# Patient Record
Sex: FEMALE | Race: ASIAN | NOT HISPANIC OR LATINO
[De-identification: names, ages, dates, MRNs, and addresses within clinical notes are randomized per-mention and may not be internally consistent; named-entity substitution may affect disease eponyms.]

---

## 2017-01-13 ENCOUNTER — APPOINTMENT (OUTPATIENT)
Dept: INTERNAL MEDICINE | Facility: CLINIC | Age: 47
End: 2017-01-13

## 2017-01-13 VITALS
SYSTOLIC BLOOD PRESSURE: 107 MMHG | HEART RATE: 79 BPM | DIASTOLIC BLOOD PRESSURE: 67 MMHG | RESPIRATION RATE: 17 BRPM | HEIGHT: 65.75 IN | WEIGHT: 126 LBS | TEMPERATURE: 97.9 F | BODY MASS INDEX: 20.49 KG/M2 | OXYGEN SATURATION: 100 %

## 2017-01-13 DIAGNOSIS — M54.5 LOW BACK PAIN: ICD-10-CM

## 2017-11-30 PROBLEM — Z23 NEED FOR INFLUENZA VACCINATION: Status: ACTIVE | Noted: 2017-11-30

## 2017-11-30 PROBLEM — J06.9 ACUTE UPPER RESPIRATORY INFECTION: Status: RESOLVED | Noted: 2017-01-13 | Resolved: 2017-11-30

## 2017-12-01 ENCOUNTER — APPOINTMENT (OUTPATIENT)
Dept: INTERNAL MEDICINE | Facility: CLINIC | Age: 47
End: 2017-12-01
Payer: OTHER GOVERNMENT

## 2017-12-01 VITALS
SYSTOLIC BLOOD PRESSURE: 86 MMHG | OXYGEN SATURATION: 100 % | TEMPERATURE: 97.7 F | WEIGHT: 128 LBS | BODY MASS INDEX: 20.82 KG/M2 | HEART RATE: 77 BPM | RESPIRATION RATE: 17 BRPM | DIASTOLIC BLOOD PRESSURE: 54 MMHG

## 2017-12-01 DIAGNOSIS — M79.644 PAIN IN RIGHT FINGER(S): ICD-10-CM

## 2017-12-01 DIAGNOSIS — Z87.440 PERSONAL HISTORY OF URINARY (TRACT) INFECTIONS: ICD-10-CM

## 2017-12-01 DIAGNOSIS — J06.9 ACUTE UPPER RESPIRATORY INFECTION, UNSPECIFIED: ICD-10-CM

## 2017-12-01 DIAGNOSIS — M77.12 LATERAL EPICONDYLITIS, LEFT ELBOW: ICD-10-CM

## 2017-12-01 DIAGNOSIS — Z23 ENCOUNTER FOR IMMUNIZATION: ICD-10-CM

## 2017-12-01 DIAGNOSIS — Z87.09 PERSONAL HISTORY OF OTHER DISEASES OF THE RESPIRATORY SYSTEM: ICD-10-CM

## 2017-12-01 LAB
BILIRUB UR QL STRIP: NEGATIVE
CLARITY UR: CLEAR
COLLECTION METHOD: NORMAL
GLUCOSE UR-MCNC: NEGATIVE
HCG UR QL: 0.2 EU/DL
HGB UR QL STRIP.AUTO: ABNORMAL
KETONES UR-MCNC: NEGATIVE
LEUKOCYTE ESTERASE UR QL STRIP: ABNORMAL
NITRITE UR QL STRIP: NEGATIVE
PH UR STRIP: 7
PROT UR STRIP-MCNC: NEGATIVE
SP GR UR STRIP: 1.01

## 2017-12-01 PROCEDURE — 99214 OFFICE O/P EST MOD 30 MIN: CPT | Mod: 25

## 2017-12-01 PROCEDURE — 81003 URINALYSIS AUTO W/O SCOPE: CPT | Mod: QW

## 2017-12-01 RX ORDER — AZITHROMYCIN 250 MG/1
250 TABLET, FILM COATED ORAL
Qty: 1 | Refills: 0 | Status: COMPLETED | COMMUNITY
Start: 2017-01-13 | End: 2017-12-01

## 2017-12-01 RX ORDER — NAPROXEN 500 MG/1
500 TABLET ORAL
Qty: 30 | Refills: 0 | Status: ACTIVE | COMMUNITY
Start: 2017-12-01 | End: 1900-01-01

## 2017-12-01 RX ORDER — NITROFURANTOIN (MONOHYDRATE/MACROCRYSTALS) 25; 75 MG/1; MG/1
100 CAPSULE ORAL TWICE DAILY
Qty: 14 | Refills: 0 | Status: ACTIVE | COMMUNITY
Start: 2016-09-06 | End: 1900-01-01

## 2017-12-01 RX ORDER — BENZONATATE 100 MG/1
100 CAPSULE ORAL
Qty: 30 | Refills: 5 | Status: COMPLETED | COMMUNITY
Start: 2017-01-13 | End: 2017-12-01

## 2017-12-02 PROBLEM — M79.644 THUMB PAIN, RIGHT: Status: ACTIVE | Noted: 2017-12-01

## 2017-12-05 LAB — BACTERIA UR CULT: NORMAL

## 2019-03-18 ENCOUNTER — RX RENEWAL (OUTPATIENT)
Age: 49
End: 2019-03-18

## 2019-03-18 RX ORDER — ERGOCALCIFEROL 1.25 MG/1
1.25 MG CAPSULE, LIQUID FILLED ORAL
Qty: 12 | Refills: 3 | Status: ACTIVE | COMMUNITY
Start: 2019-03-18 | End: 1900-01-01

## 2019-08-29 ENCOUNTER — APPOINTMENT (OUTPATIENT)
Dept: PULMONOLOGY | Facility: CLINIC | Age: 49
End: 2019-08-29

## 2019-09-19 ENCOUNTER — APPOINTMENT (OUTPATIENT)
Dept: PULMONOLOGY | Facility: CLINIC | Age: 49
End: 2019-09-19

## 2021-08-30 ENCOUNTER — APPOINTMENT (OUTPATIENT)
Dept: PULMONOLOGY | Facility: CLINIC | Age: 51
End: 2021-08-30
Payer: COMMERCIAL

## 2021-08-30 VITALS
HEART RATE: 75 BPM | DIASTOLIC BLOOD PRESSURE: 63 MMHG | SYSTOLIC BLOOD PRESSURE: 98 MMHG | TEMPERATURE: 98.7 F | OXYGEN SATURATION: 98 %

## 2021-08-30 DIAGNOSIS — Z86.11 PERSONAL HISTORY OF TUBERCULOSIS: ICD-10-CM

## 2021-08-30 PROCEDURE — 99214 OFFICE O/P EST MOD 30 MIN: CPT | Mod: 25

## 2021-08-30 PROCEDURE — 71046 X-RAY EXAM CHEST 2 VIEWS: CPT

## 2021-08-30 PROCEDURE — 36415 COLL VENOUS BLD VENIPUNCTURE: CPT

## 2021-08-31 NOTE — PROCEDURE
[FreeTextEntry1] : \par  Xray Chest 2 Views PA/Lat             Final\par \par No Documents Attached\par \par \par \par \par   \par Chest x-ray PA and lateral views performed in my office today showed multiple calcified lung nodules on bilateral upper lobes, no evidence of infiltrates or pleural effusions. \par \par \par  Ordered by: SIA PAT       Collected/Examined: 30Aug2021 06:51PM       \par Verified by: SIA PAT 30Aug2021 06:54PM       \par  Result Communication: No patient communication needed at this time;\par Stage: Final       \par  Performed at: In Office       Performed by: SIA PAT       Resulted: 30Aug2021 06:51PM       Last Updated: 30Aug2021 06:54PM       Accession: [id number]

## 2021-08-31 NOTE — ASSESSMENT
[FreeTextEntry1] : Check sputum for culture and AFB.\par Venipuncture with labs drawn in office\par Get chest CT scan for further evaluation.

## 2021-09-06 LAB
ALBUMIN SERPL ELPH-MCNC: 4.8 G/DL
ALP BLD-CCNC: 59 U/L
ALT SERPL-CCNC: 13 U/L
ANION GAP SERPL CALC-SCNC: 10 MMOL/L
AST SERPL-CCNC: 19 U/L
BACTERIA SPT CULT: ABNORMAL
BASOPHILS # BLD AUTO: 0.02 K/UL
BASOPHILS NFR BLD AUTO: 0.5 %
BILIRUB SERPL-MCNC: 0.3 MG/DL
BUN SERPL-MCNC: 14 MG/DL
CALCIUM SERPL-MCNC: 9.6 MG/DL
CHLORIDE SERPL-SCNC: 103 MMOL/L
CO2 SERPL-SCNC: 27 MMOL/L
CREAT SERPL-MCNC: 0.65 MG/DL
EOSINOPHIL # BLD AUTO: 0.04 K/UL
EOSINOPHIL NFR BLD AUTO: 1.1 %
ERYTHROCYTE [SEDIMENTATION RATE] IN BLOOD BY WESTERGREN METHOD: 14 MM/HR
GLUCOSE SERPL-MCNC: 94 MG/DL
HCT VFR BLD CALC: 38.9 %
HGB BLD-MCNC: 12.1 G/DL
IMM GRANULOCYTES NFR BLD AUTO: 0.3 %
LYMPHOCYTES # BLD AUTO: 1.6 K/UL
LYMPHOCYTES NFR BLD AUTO: 42.1 %
M TB IFN-G BLD-IMP: NEGATIVE
MAN DIFF?: NORMAL
MCHC RBC-ENTMCNC: 28.7 PG
MCHC RBC-ENTMCNC: 31.1 GM/DL
MCV RBC AUTO: 92.2 FL
MONOCYTES # BLD AUTO: 0.19 K/UL
MONOCYTES NFR BLD AUTO: 5 %
NEUTROPHILS # BLD AUTO: 1.94 K/UL
NEUTROPHILS NFR BLD AUTO: 51 %
PLATELET # BLD AUTO: 193 K/UL
POTASSIUM SERPL-SCNC: 4.6 MMOL/L
PROT SERPL-MCNC: 7.7 G/DL
QUANTIFERON TB PLUS MITOGEN MINUS NIL: 1.26 IU/ML
QUANTIFERON TB PLUS NIL: 0.04 IU/ML
QUANTIFERON TB PLUS TB1 MINUS NIL: 0.02 IU/ML
QUANTIFERON TB PLUS TB2 MINUS NIL: 0.01 IU/ML
RBC # BLD: 4.22 M/UL
RBC # FLD: 12.1 %
SODIUM SERPL-SCNC: 140 MMOL/L
WBC # FLD AUTO: 3.8 K/UL

## 2021-09-06 RX ORDER — AMOXICILLIN AND CLAVULANATE POTASSIUM 875; 125 MG/1; MG/1
875-125 TABLET, COATED ORAL
Qty: 14 | Refills: 0 | Status: COMPLETED | COMMUNITY
Start: 2021-09-06 | End: 2021-09-13

## 2021-09-17 ENCOUNTER — APPOINTMENT (OUTPATIENT)
Dept: PULMONOLOGY | Facility: CLINIC | Age: 51
End: 2021-09-17
Payer: COMMERCIAL

## 2021-09-17 VITALS
TEMPERATURE: 97.6 F | HEART RATE: 83 BPM | SYSTOLIC BLOOD PRESSURE: 97 MMHG | OXYGEN SATURATION: 99 % | DIASTOLIC BLOOD PRESSURE: 61 MMHG

## 2021-09-17 PROCEDURE — 99214 OFFICE O/P EST MOD 30 MIN: CPT

## 2021-09-19 NOTE — DISCUSSION/SUMMARY
[FreeTextEntry1] : Cough positive secondary to bronchiectasis\par Check pulmonary function test for follow-up

## 2021-09-19 NOTE — PROCEDURE
[FreeTextEntry1] : Chest CT scan performed on September 3, 2021 showed stable scarring at both lung apices left greater than right.  Stable bilateral calcified granulomas.  Bronchiectasis with foci of mucous plugging again noted in the right middle lobe.

## 2021-10-15 DIAGNOSIS — Z01.812 ENCOUNTER FOR PREPROCEDURAL LABORATORY EXAMINATION: ICD-10-CM

## 2021-10-21 ENCOUNTER — APPOINTMENT (OUTPATIENT)
Dept: PULMONOLOGY | Facility: CLINIC | Age: 51
End: 2021-10-21
Payer: COMMERCIAL

## 2021-10-21 VITALS
DIASTOLIC BLOOD PRESSURE: 62 MMHG | OXYGEN SATURATION: 99 % | HEART RATE: 88 BPM | TEMPERATURE: 98 F | SYSTOLIC BLOOD PRESSURE: 96 MMHG

## 2021-10-21 PROCEDURE — 88738 HGB QUANT TRANSCUTANEOUS: CPT

## 2021-10-21 PROCEDURE — 99214 OFFICE O/P EST MOD 30 MIN: CPT | Mod: 25

## 2021-10-21 PROCEDURE — 94727 GAS DIL/WSHOT DETER LNG VOL: CPT

## 2021-10-21 PROCEDURE — 94010 BREATHING CAPACITY TEST: CPT

## 2021-10-21 PROCEDURE — 94729 DIFFUSING CAPACITY: CPT

## 2021-10-21 PROCEDURE — ZZZZZ: CPT

## 2021-10-22 NOTE — PROCEDURE
[FreeTextEntry1] : Pulmonary function test performed in my office today: Spirometry is within normal limits; lung volume is within normal limits; diffusion is within normal limits.

## 2021-10-22 NOTE — DISCUSSION/SUMMARY
[FreeTextEntry1] : Cough possibly secondary to bronchiectasis\par Check pulmonary function test for follow-up

## 2021-10-25 RX ORDER — BUDESONIDE AND FORMOTEROL FUMARATE DIHYDRATE 160; 4.5 UG/1; UG/1
160-4.5 AEROSOL RESPIRATORY (INHALATION) TWICE DAILY
Qty: 1 | Refills: 1 | Status: ACTIVE | COMMUNITY
Start: 2021-10-25 | End: 1900-01-01

## 2021-11-01 RX ORDER — FLUTICASONE FUROATE, UMECLIDINIUM BROMIDE AND VILANTEROL TRIFENATATE 100; 62.5; 25 UG/1; UG/1; UG/1
100-62.5-25 POWDER RESPIRATORY (INHALATION) DAILY
Qty: 3 | Refills: 3 | Status: ACTIVE | COMMUNITY
Start: 2021-10-21 | End: 1900-01-01

## 2021-11-03 RX ORDER — FLUTICASONE FUROATE AND VILANTEROL TRIFENATATE 100; 25 UG/1; UG/1
100-25 POWDER RESPIRATORY (INHALATION) DAILY
Qty: 3 | Refills: 3 | Status: ACTIVE | COMMUNITY
Start: 2021-11-03 | End: 1900-01-01

## 2021-12-09 ENCOUNTER — APPOINTMENT (OUTPATIENT)
Dept: PULMONOLOGY | Facility: CLINIC | Age: 51
End: 2021-12-09
Payer: COMMERCIAL

## 2021-12-09 VITALS
BODY MASS INDEX: 20.4 KG/M2 | WEIGHT: 125.44 LBS | TEMPERATURE: 97.3 F | DIASTOLIC BLOOD PRESSURE: 64 MMHG | SYSTOLIC BLOOD PRESSURE: 100 MMHG | OXYGEN SATURATION: 100 % | HEIGHT: 65.75 IN | HEART RATE: 82 BPM

## 2021-12-09 PROCEDURE — 99214 OFFICE O/P EST MOD 30 MIN: CPT

## 2022-02-24 ENCOUNTER — APPOINTMENT (OUTPATIENT)
Dept: PULMONOLOGY | Facility: CLINIC | Age: 52
End: 2022-02-24

## 2022-03-10 ENCOUNTER — APPOINTMENT (OUTPATIENT)
Dept: PULMONOLOGY | Facility: CLINIC | Age: 52
End: 2022-03-10
Payer: COMMERCIAL

## 2022-03-10 VITALS
DIASTOLIC BLOOD PRESSURE: 65 MMHG | HEART RATE: 66 BPM | SYSTOLIC BLOOD PRESSURE: 104 MMHG | OXYGEN SATURATION: 100 % | TEMPERATURE: 97.4 F

## 2022-03-10 PROCEDURE — 99214 OFFICE O/P EST MOD 30 MIN: CPT

## 2022-03-10 NOTE — REASON FOR VISIT
[Follow-Up] : a follow-up visit [Abnormal CXR/ Chest CT] : an abnormal CXR/ chest CT [Cough] : cough [TextBox_44] : Complains of dry cough and throat itch at times

## 2022-04-14 ENCOUNTER — APPOINTMENT (OUTPATIENT)
Dept: PULMONOLOGY | Facility: CLINIC | Age: 52
End: 2022-04-14

## 2022-05-19 ENCOUNTER — APPOINTMENT (OUTPATIENT)
Dept: PULMONOLOGY | Facility: CLINIC | Age: 52
End: 2022-05-19
Payer: COMMERCIAL

## 2022-05-19 VITALS
RESPIRATION RATE: 16 BRPM | WEIGHT: 125 LBS | DIASTOLIC BLOOD PRESSURE: 61 MMHG | SYSTOLIC BLOOD PRESSURE: 96 MMHG | OXYGEN SATURATION: 100 % | HEIGHT: 65 IN | BODY MASS INDEX: 20.83 KG/M2 | TEMPERATURE: 97.8 F | HEART RATE: 73 BPM

## 2022-05-19 PROCEDURE — 88738 HGB QUANT TRANSCUTANEOUS: CPT

## 2022-05-19 PROCEDURE — ZZZZZ: CPT

## 2022-05-19 PROCEDURE — 94727 GAS DIL/WSHOT DETER LNG VOL: CPT

## 2022-05-19 PROCEDURE — 99214 OFFICE O/P EST MOD 30 MIN: CPT | Mod: 25

## 2022-05-19 PROCEDURE — 94729 DIFFUSING CAPACITY: CPT

## 2022-05-19 PROCEDURE — 94010 BREATHING CAPACITY TEST: CPT

## 2022-05-19 NOTE — ASSESSMENT
[FreeTextEntry1] : Continue Breo Ellipta\par Continue omeprazole for GERD\par Return for pulmonary follow-up and PFT in 3 months.

## 2022-05-19 NOTE — PROCEDURE
[FreeTextEntry1] : Pulmonary Function Test performed in my office today: Spirometry: Within normal limits; Lung Volume: Within normal limits; Diffusion: Within normal limits.\par

## 2022-05-23 LAB — POCT - HEMOGLOBIN (HGB), QUANTITATIVE, TRANSCUTANEOUS: 15.7

## 2022-08-11 ENCOUNTER — APPOINTMENT (OUTPATIENT)
Dept: PULMONOLOGY | Facility: CLINIC | Age: 52
End: 2022-08-11

## 2022-08-11 VITALS
SYSTOLIC BLOOD PRESSURE: 97 MMHG | BODY MASS INDEX: 20.83 KG/M2 | OXYGEN SATURATION: 98 % | HEART RATE: 92 BPM | TEMPERATURE: 97.8 F | RESPIRATION RATE: 16 BRPM | WEIGHT: 125 LBS | DIASTOLIC BLOOD PRESSURE: 62 MMHG | HEIGHT: 65 IN

## 2022-08-11 DIAGNOSIS — U07.1 COVID-19: ICD-10-CM

## 2022-08-11 PROCEDURE — 88738 HGB QUANT TRANSCUTANEOUS: CPT

## 2022-08-11 PROCEDURE — 94727 GAS DIL/WSHOT DETER LNG VOL: CPT

## 2022-08-11 PROCEDURE — ZZZZZ: CPT

## 2022-08-11 PROCEDURE — 99214 OFFICE O/P EST MOD 30 MIN: CPT | Mod: 25

## 2022-08-11 PROCEDURE — 94010 BREATHING CAPACITY TEST: CPT

## 2022-08-11 PROCEDURE — 71046 X-RAY EXAM CHEST 2 VIEWS: CPT

## 2022-08-11 PROCEDURE — 36415 COLL VENOUS BLD VENIPUNCTURE: CPT

## 2022-08-11 PROCEDURE — 94729 DIFFUSING CAPACITY: CPT

## 2022-08-11 RX ORDER — AZITHROMYCIN 250 MG/1
250 TABLET, FILM COATED ORAL
Qty: 1 | Refills: 0 | Status: ACTIVE | COMMUNITY
Start: 2022-08-11 | End: 1900-01-01

## 2022-08-11 RX ORDER — PREDNISONE 10 MG/1
10 TABLET ORAL
Qty: 12 | Refills: 0 | Status: ACTIVE | COMMUNITY
Start: 2022-08-11 | End: 1900-01-01

## 2022-08-12 NOTE — DISCUSSION/SUMMARY
[FreeTextEntry1] : Cough possibly secondary to bronchiectasis exacerbation and GERD.  Lung scarring secondary to history of tuberculosis infection\par

## 2022-08-12 NOTE — ASSESSMENT
[FreeTextEntry1] : Venipuncture with labs drawn in office\par Start Z-Charlie and prednisone taper\par Continue Breo Ellipta\par Continue omeprazole for GERD\par Return for pulmonary follow-up and PFT in 3 months.

## 2022-08-20 LAB
ALBUMIN SERPL ELPH-MCNC: 5 G/DL
ALP BLD-CCNC: 69 U/L
ALT SERPL-CCNC: 16 U/L
ANION GAP SERPL CALC-SCNC: 13 MMOL/L
AST SERPL-CCNC: 19 U/L
BASOPHILS # BLD AUTO: 0.03 K/UL
BASOPHILS NFR BLD AUTO: 0.6 %
BILIRUB SERPL-MCNC: 0.6 MG/DL
BUN SERPL-MCNC: 13 MG/DL
CALCIUM SERPL-MCNC: 10 MG/DL
CHLORIDE SERPL-SCNC: 103 MMOL/L
CO2 SERPL-SCNC: 25 MMOL/L
CREAT SERPL-MCNC: 0.68 MG/DL
CRP SERPL-MCNC: <3 MG/L
DEPRECATED D DIMER PPP IA-ACNC: <150 NG/ML DDU
EGFR: 104 ML/MIN/1.73M2
EOSINOPHIL # BLD AUTO: 0.05 K/UL
EOSINOPHIL NFR BLD AUTO: 1 %
FERRITIN SERPL-MCNC: 226 NG/ML
GLUCOSE SERPL-MCNC: 93 MG/DL
HCT VFR BLD CALC: 41.6 %
HGB BLD-MCNC: 13.4 G/DL
IMM GRANULOCYTES NFR BLD AUTO: 0.2 %
LYMPHOCYTES # BLD AUTO: 1.72 K/UL
LYMPHOCYTES NFR BLD AUTO: 35.2 %
MAN DIFF?: NORMAL
MCHC RBC-ENTMCNC: 29.3 PG
MCHC RBC-ENTMCNC: 32.2 GM/DL
MCV RBC AUTO: 91 FL
MONOCYTES # BLD AUTO: 0.31 K/UL
MONOCYTES NFR BLD AUTO: 6.3 %
NEUTROPHILS # BLD AUTO: 2.77 K/UL
NEUTROPHILS NFR BLD AUTO: 56.7 %
PLATELET # BLD AUTO: 210 K/UL
POCT - HEMOGLOBIN (HGB), QUANTITATIVE, TRANSCUTANEOUS: 12.7
POTASSIUM SERPL-SCNC: 4.6 MMOL/L
PROCALCITONIN SERPL-MCNC: <0.02 NG/ML
PROT SERPL-MCNC: 7.7 G/DL
RBC # BLD: 4.57 M/UL
RBC # FLD: 11.8 %
SODIUM SERPL-SCNC: 140 MMOL/L
WBC # FLD AUTO: 4.89 K/UL

## 2022-09-01 ENCOUNTER — APPOINTMENT (OUTPATIENT)
Dept: PULMONOLOGY | Facility: CLINIC | Age: 52
End: 2022-09-01

## 2023-01-13 ENCOUNTER — APPOINTMENT (OUTPATIENT)
Dept: PULMONOLOGY | Facility: CLINIC | Age: 53
End: 2023-01-13

## 2023-01-13 ENCOUNTER — APPOINTMENT (OUTPATIENT)
Dept: PULMONOLOGY | Facility: CLINIC | Age: 53
End: 2023-01-13
Payer: COMMERCIAL

## 2023-01-13 VITALS — SYSTOLIC BLOOD PRESSURE: 93 MMHG | OXYGEN SATURATION: 99 % | HEART RATE: 87 BPM | DIASTOLIC BLOOD PRESSURE: 64 MMHG

## 2023-01-13 PROCEDURE — 94729 DIFFUSING CAPACITY: CPT

## 2023-01-13 PROCEDURE — 99214 OFFICE O/P EST MOD 30 MIN: CPT | Mod: 25

## 2023-01-13 PROCEDURE — 94010 BREATHING CAPACITY TEST: CPT

## 2023-01-13 PROCEDURE — 94727 GAS DIL/WSHOT DETER LNG VOL: CPT

## 2023-01-13 PROCEDURE — 88738 HGB QUANT TRANSCUTANEOUS: CPT

## 2023-01-15 NOTE — HISTORY OF PRESENT ILLNESS
[TextBox_4] : PAULETTE OATES is a 52 year old female with history of Bronchiectasis, GERD, who presents to the office for follow up evaluation of a cough. Patient reports that her dry cough has significantly improved since last visit. She denies taking her Breo for her history of Bronchiectasis. She denies taking her Omeprazole for her history of GERD.\par \par \par

## 2023-01-15 NOTE — ASSESSMENT
[FreeTextEntry1] : Obtained and reviewed PFT results with patient today. \par Repeat chest CT scan to follow up on her history of Bronchiectasis, lung scarring, and hemoptysis.\par Discontinue Breo and omeprazole, unless symptoms flare up\par Return for pulmonary follow up 1 month after receiving chest CT scan

## 2023-01-15 NOTE — DISCUSSION/SUMMARY
[FreeTextEntry1] : Ms. PAULETTE OATES is an 52 year old female, history of Bronchiectasis, GERD with prior cough possibly secondary to bronchiectasis exacerbation and GERD that has significantly improved since last visit. Patient is asymptomatic and we will discontinue her current medication unless her symptoms flare up again.

## 2023-01-15 NOTE — ADDENDUM
[FreeTextEntry1] : I, Virgie Tranning, acted solely as a scribe for Dr. Zhanna Ramon D.O., on this date 01/13/2023. \par \par All medical record entries made by the Scribe were at my, Dr. Zhanna Ramon D.O., direction and personally dictated by me on 01/13/2023. I have reviewed the chart and agree that the record accurately reflects my personal performance of the history, physical exam, assessment and plan. I have also personally directed, reviewed, and agreed with the chart.

## 2023-02-17 ENCOUNTER — APPOINTMENT (OUTPATIENT)
Dept: PULMONOLOGY | Facility: CLINIC | Age: 53
End: 2023-02-17
Payer: COMMERCIAL

## 2023-02-17 VITALS — DIASTOLIC BLOOD PRESSURE: 65 MMHG | SYSTOLIC BLOOD PRESSURE: 95 MMHG | OXYGEN SATURATION: 100 % | HEART RATE: 91 BPM

## 2023-02-17 PROCEDURE — 99213 OFFICE O/P EST LOW 20 MIN: CPT

## 2023-02-17 NOTE — ADDENDUM
[FreeTextEntry1] : I, Virgie Tranning, acted solely as a scribe for Dr. Zhanna Ramon D.O., on this date 02/17/2023. \par \par All medical record entries made by the Scribe were at my, Dr. Zhanna Ramon D.O., direction and personally dictated by me on 02/17/2023. I have reviewed the chart and agree that the record accurately reflects my personal performance of the history, physical exam, assessment and plan. I have also personally directed, reviewed, and agreed with the chart.

## 2023-02-17 NOTE — REASON FOR VISIT
[Follow-Up] : a follow-up visit [Cough] : cough [Abnormal CXR/ Chest CT] : an abnormal CXR/ chest CT [Shortness of Breath] : shortness of breath

## 2023-02-17 NOTE — HISTORY OF PRESENT ILLNESS
[TextBox_4] : PAULETTE OATES is a 52 year old female, with history of Bronchiectasis, GERD, lung scarring, who presents to the office for follow up evaluation. Patient reports that she is feeling well overall with no respiratory complaints. She reports that her prior dyspnea has resolved and she only has an occasional cough.

## 2023-02-17 NOTE — PROCEDURE
[FreeTextEntry1] : Chest CT scan done on 02/10/2023 reviewed by Dr. Zhanna Ramon:\par \par Impression:\par \par Stable biapical scarring\par \par Stable bilateral calcified granulomas\par \par Stable bronchiectasis and mucous plugging in the right middle lobe.

## 2023-02-17 NOTE — DISCUSSION/SUMMARY
[FreeTextEntry1] : Ms. PAULETTE OATES is an 52 year old female, history of Bronchiectasis, GERD, lung scarring. Patient's prior cough and dyspnea has resolved. Secondly, patient's lung scarring and bronchiectasis are currently stable indicated on recent chest CT scan. Medication is unnecessary at this time.

## 2023-02-17 NOTE — ASSESSMENT
[FreeTextEntry1] : Reviewed chest CT scan done on 02/10/2023 with patient today. \par Return for pulmonary follow up in 3 months \par Repeat chest CT scan for follow-up in 1 year.

## 2023-06-30 ENCOUNTER — APPOINTMENT (OUTPATIENT)
Dept: PULMONOLOGY | Facility: CLINIC | Age: 53
End: 2023-06-30
Payer: COMMERCIAL

## 2023-06-30 VITALS
BODY MASS INDEX: 21.17 KG/M2 | RESPIRATION RATE: 14 BRPM | WEIGHT: 124 LBS | SYSTOLIC BLOOD PRESSURE: 102 MMHG | HEART RATE: 95 BPM | HEIGHT: 64 IN | OXYGEN SATURATION: 99 % | DIASTOLIC BLOOD PRESSURE: 65 MMHG

## 2023-06-30 DIAGNOSIS — R35.89 OTHER POLYURIA: ICD-10-CM

## 2023-06-30 DIAGNOSIS — R31.29 OTHER MICROSCOPIC HEMATURIA: ICD-10-CM

## 2023-06-30 PROCEDURE — 94727 GAS DIL/WSHOT DETER LNG VOL: CPT

## 2023-06-30 PROCEDURE — 94010 BREATHING CAPACITY TEST: CPT

## 2023-06-30 PROCEDURE — ZZZZZ: CPT

## 2023-06-30 PROCEDURE — 81003 URINALYSIS AUTO W/O SCOPE: CPT | Mod: QW

## 2023-06-30 PROCEDURE — 88738 HGB QUANT TRANSCUTANEOUS: CPT

## 2023-06-30 PROCEDURE — 82044 UR ALBUMIN SEMIQUANTITATIVE: CPT | Mod: QW

## 2023-06-30 PROCEDURE — 94729 DIFFUSING CAPACITY: CPT

## 2023-06-30 PROCEDURE — 99214 OFFICE O/P EST MOD 30 MIN: CPT | Mod: 25

## 2023-07-02 LAB
APPEARANCE: CLEAR
BACTERIA UR CULT: NORMAL
BACTERIA: NEGATIVE /HPF
BILIRUB UR QL STRIP: NORMAL
BILIRUBIN URINE: NEGATIVE
BLOOD URINE: NEGATIVE
CAST: 0 /LPF
CLARITY UR: CLEAR
COLLECTION METHOD: NORMAL
COLOR: YELLOW
CREAT SPEC-SCNC: 18 MG/DL
EPITHELIAL CELLS: 0 /HPF
GLUCOSE QUALITATIVE U: NEGATIVE MG/DL
GLUCOSE UR-MCNC: NORMAL
HCG UR QL: 0.2 EU/DL
HGB UR QL STRIP.AUTO: NORMAL
KETONES UR-MCNC: NORMAL
KETONES URINE: NEGATIVE MG/DL
LEUKOCYTE ESTERASE UR QL STRIP: NORMAL
LEUKOCYTE ESTERASE URINE: NEGATIVE
MICROALBUMIN 24H UR DL<=1MG/L-MCNC: <1.2 MG/DL
MICROALBUMIN/CREAT 24H UR-RTO: NORMAL MG/G
MICROSCOPIC-UA: NORMAL
NITRITE UR QL STRIP: NORMAL
NITRITE URINE: NEGATIVE
PH UR STRIP: 7
PH URINE: 7
POCT - HEMOGLOBIN (HGB), QUANTITATIVE, TRANSCUTANEOUS: 13.2
PROT UR STRIP-MCNC: NORMAL
PROTEIN URINE: NEGATIVE MG/DL
RED BLOOD CELLS URINE: 0 /HPF
SP GR UR STRIP: 1.01
SPECIFIC GRAVITY URINE: 1.01
UROBILINOGEN URINE: 0.2 MG/DL
WHITE BLOOD CELLS URINE: 0 /HPF

## 2023-07-02 NOTE — DISCUSSION/SUMMARY
[FreeTextEntry1] : Ms. PAULETTE OATES is an 52 year old female, history of Bronchiectasis, GERD, lung scarring. Patient has microscopic hematuria indicated in urinalysis today.

## 2023-07-02 NOTE — PROCEDURE
[FreeTextEntry1] : Pulmonary Function Test obtained in office today which revealed: Spirometry: Within normal limits, Lung Volume: Within normal limits, Diffusion: Within normal limits. \par ___\par \par  POCT - Hemoglobin (Hgb), quantitative, transcutaneous             Final\par \par No Documents Attached\par \par \par   Test   Result   Flag Reference Goal Last Verified \par   POCT - Hemoglobin (Hgb), quantitative, transcutaneous 13.2      REQUIRED \par \par  Ordered by: SIA PAT       Collected/Examined: 30Jun2023 10:37AM       \par Verification Required       Stage: Final       \par  Performed at: In Office       Performed by: FEDERICO HERNANDEZ       Resulted: 30Jun2023 10:37AM       Last Updated: 30Jun2023 10:40AM       \par ___\par \par  POCT - A Urinalysis Dip (Automated)             Final\par \par No Documents Attached\par \par \par   Test   Result   Flag Reference Goal Last Verified \par   Glucose NEG      REQUIRED \par   Bilirubin NEG      REQUIRED \par   Ketone NEG      REQUIRED \par   Specific Gravity 1.010      REQUIRED \par   Blood TRACE-INTACT      REQUIRED \par   pH 7.0      REQUIRED \par   Protein NEG      REQUIRED \par   Urobilinogen 0.2 EU/dl      REQUIRED \par   Nitrite NEG      REQUIRED \par   Leukocytes NEG      REQUIRED \par   Clarity Clear      REQUIRED \par   Collection Method Clean Catch      REQUIRED \par \par  Ordered by: SIA PAT       Collected/Examined: 30Jun2023 11:06AM       \par Verification Required       Stage: Final       \par  Performed at: In Office       Performed by: SIA PAT       Resulted: 30Jun2023 11:06AM       Last Updated: 30Jun2023 11:06AM       \par

## 2023-07-02 NOTE — ASSESSMENT
[FreeTextEntry1] : Obtained and reviewed pulmonary function test and urinalysis results with patient today. \par Repeat chest CT scan in 8 months.\par Sent urine to lab for further evaluation of microscopic hematuria found in Urinalysis today. \par Return for pulmonary follow up in 4 months

## 2023-07-02 NOTE — ADDENDUM
[FreeTextEntry1] : I, Virgie Tranning, acted solely as a scribe for Dr. Zhanna Ramon D.O., on this date 06/30/2023. \par \par All medical record entries made by the Scribe were at my, Dr. Zhanna Ramon D.O., direction and personally dictated by me on 06/30/2023. I have reviewed the chart and agree that the record accurately reflects my personal performance of the history, physical exam, assessment and plan. I have also personally directed, reviewed, and agreed with the chart.

## 2023-07-02 NOTE — HISTORY OF PRESENT ILLNESS
[TextBox_4] : PAULETTE OATES is a 52 year old female, with history of Bronchiectasis, GERD, lung scarring, who presents to the office for follow up evaluation. Patient reports that her cough has resolved since her last visit. She states of having an acute polyuria started 2 days ago.

## 2024-01-03 ENCOUNTER — APPOINTMENT (OUTPATIENT)
Dept: PULMONOLOGY | Facility: CLINIC | Age: 54
End: 2024-01-03
Payer: COMMERCIAL

## 2024-01-03 VITALS — SYSTOLIC BLOOD PRESSURE: 99 MMHG | HEART RATE: 74 BPM | DIASTOLIC BLOOD PRESSURE: 51 MMHG | OXYGEN SATURATION: 100 %

## 2024-01-03 DIAGNOSIS — K21.9 GASTRO-ESOPHAGEAL REFLUX DISEASE W/OUT ESOPHAGITIS: ICD-10-CM

## 2024-01-03 PROCEDURE — 99214 OFFICE O/P EST MOD 30 MIN: CPT | Mod: 25

## 2024-01-03 PROCEDURE — 94010 BREATHING CAPACITY TEST: CPT

## 2024-01-03 PROCEDURE — 88738 HGB QUANT TRANSCUTANEOUS: CPT

## 2024-01-03 PROCEDURE — 94727 GAS DIL/WSHOT DETER LNG VOL: CPT

## 2024-01-03 PROCEDURE — ZZZZZ: CPT

## 2024-01-03 PROCEDURE — 94729 DIFFUSING CAPACITY: CPT

## 2024-01-04 PROBLEM — K21.9 GERD (GASTROESOPHAGEAL REFLUX DISEASE): Status: ACTIVE | Noted: 2022-05-19

## 2024-01-04 NOTE — HISTORY OF PRESENT ILLNESS
[TextBox_4] : PAULETTE OATES is a 52 year old female, with history of Bronchiectasis, GERD, lung scarring, who presents to the office for follow up evaluation. Patient reports that she is feeling well overall with no respiratory complaints.

## 2024-01-04 NOTE — ADDENDUM
[FreeTextEntry1] : I, Virgie Tranning, acted solely as a scribe for Dr. Zhanna Ramon D.O., on this date 01/03/2024.   All medical record entries made by the Scribe were at my, Dr. Zhanna Ramon D.O., direction and personally dictated by me on 01/03/2024. I have reviewed the chart and agree that the record accurately reflects my personal performance of the history, physical exam, assessment and plan. I have also personally directed, reviewed, and agreed with the chart.

## 2024-01-04 NOTE — DISCUSSION/SUMMARY
[FreeTextEntry1] : Ms. PAULETTE OATES is an 52 year old female, history of Bronchiectasis, GERD, lung scarring. Patient appears stable from a pulmonary perspective.

## 2024-01-04 NOTE — ASSESSMENT
[FreeTextEntry1] : Obtained and reviewed pulmonary function test results with patient today. Repeat chest CT scan in 1 month. Return for pulmonary follow up in 2 months after receiving chest CT scan. Continue Flonase nasal spray for postnasal drip.

## 2024-01-04 NOTE — PROCEDURE
[FreeTextEntry1] : Pulmonary Function Test obtained in office today which revealed: Spirometry: Within normal limits, Lung Volume: Within normal limits, Diffusion: Within normal limits.

## 2024-01-07 LAB — POCT - HEMOGLOBIN (HGB), QUANTITATIVE, TRANSCUTANEOUS: 12.4

## 2024-03-21 ENCOUNTER — APPOINTMENT (OUTPATIENT)
Dept: PULMONOLOGY | Facility: CLINIC | Age: 54
End: 2024-03-21

## 2024-04-05 ENCOUNTER — APPOINTMENT (OUTPATIENT)
Dept: PULMONOLOGY | Facility: CLINIC | Age: 54
End: 2024-04-05
Payer: COMMERCIAL

## 2024-04-05 VITALS — SYSTOLIC BLOOD PRESSURE: 102 MMHG | OXYGEN SATURATION: 100 % | HEART RATE: 75 BPM | DIASTOLIC BLOOD PRESSURE: 62 MMHG

## 2024-04-05 DIAGNOSIS — B99.9 BRONCHIECTASIS WITH (ACUTE) EXACERBATION: ICD-10-CM

## 2024-04-05 DIAGNOSIS — J47.1 BRONCHIECTASIS WITH (ACUTE) EXACERBATION: ICD-10-CM

## 2024-04-05 PROCEDURE — 94060 EVALUATION OF WHEEZING: CPT

## 2024-04-05 PROCEDURE — ZZZZZ: CPT

## 2024-04-05 PROCEDURE — 94729 DIFFUSING CAPACITY: CPT

## 2024-04-05 PROCEDURE — 99214 OFFICE O/P EST MOD 30 MIN: CPT | Mod: 25

## 2024-04-05 PROCEDURE — 94727 GAS DIL/WSHOT DETER LNG VOL: CPT

## 2024-04-05 RX ORDER — AZITHROMYCIN 250 MG/1
250 TABLET, FILM COATED ORAL
Qty: 1 | Refills: 0 | Status: ACTIVE | COMMUNITY
Start: 2024-04-05 | End: 1900-01-01

## 2024-04-05 RX ORDER — FLUTICASONE PROPIONATE 50 UG/1
50 SPRAY, METERED NASAL
Qty: 1 | Refills: 3 | Status: ACTIVE | COMMUNITY
Start: 2022-03-10 | End: 1900-01-01

## 2024-04-05 RX ORDER — METHYLPREDNISOLONE 4 MG/1
4 TABLET ORAL
Qty: 1 | Refills: 0 | Status: ACTIVE | COMMUNITY
Start: 2024-04-05 | End: 1900-01-01

## 2024-04-05 NOTE — ASSESSMENT
[FreeTextEntry1] : Obtained and reviewed pulmonary function test results with patient today. Prescribed Z-Charlie and started patient on a Medrol Dosepak taper for bronchiectasis exacerbation. Prescribed Flonase nasal spray 2 sprays each nostril once a day for postnasal drip. Repeat chest CT scan for follow up. Return for pulmonary follow up in 1 month after receiving chest CT scan.

## 2024-04-05 NOTE — REASON FOR VISIT
[Follow-Up] : a follow-up visit [Cough] : cough [Abnormal CXR/ Chest CT] : an abnormal CXR/ chest CT [Bronchiectasis] : bronchiectasis [Pulmonary Nodules] : pulmonary nodules

## 2024-04-05 NOTE — PROCEDURE
[FreeTextEntry1] : Pulmonary Function Test obtained in office today which revealed: Spirometry: Mild obstructive airway disease with some improvement post bronchodilator, Lung Volume: Within normal limits with air trapping, Diffusion: Within normal limits.

## 2024-04-05 NOTE — HISTORY OF PRESENT ILLNESS
[TextBox_4] : PAULETTE OATES is a 52 year old female, with history of Bronchiectasis, GERD, lung scarring, who presents to the office for follow up evaluation. Patient reports of having symptoms of a cough that started 2 months ago. She also states of having symptoms of throat clearing and dyspnea on exertion when going up an incline. Patient reports that she received a course of Z-Charlie and Benzonatate for her symptoms. She states that she is currently not using a nasal spray.    that her insurance did not approve order for chest CT scan.

## 2024-04-05 NOTE — REVIEW OF SYSTEMS
[Negative] : Endocrine [Cough] : cough [SOB on Exertion] : sob on exertion [TextBox_14] : Throat clearing

## 2024-04-05 NOTE — ADDENDUM
[FreeTextEntry1] : I, Virgie Tranning, acted solely as a scribe for Dr. Zhanna Ramon D.O., on this date 04/05/2024.   All medical record entries made by the Scribe were at my, Dr. Zhanna Ramon D.O., direction and personally dictated by me on 04/05/2024. I have reviewed the chart and agree that the record accurately reflects my personal performance of the history, physical exam, assessment and plan. I have also personally directed, reviewed, and agreed with the chart.

## 2024-04-05 NOTE — DISCUSSION/SUMMARY
[FreeTextEntry1] : Ms. PAULETTE OATES is an 52 year old female, history of Bronchiectasis, GERD, lung scarring. Cough secondary to combination of postnasal drip and bronchiectasis exacerbation.

## 2024-04-18 ENCOUNTER — NON-APPOINTMENT (OUTPATIENT)
Age: 54
End: 2024-04-18

## 2024-05-16 ENCOUNTER — RESULT CHARGE (OUTPATIENT)
Age: 54
End: 2024-05-16

## 2024-05-17 ENCOUNTER — APPOINTMENT (OUTPATIENT)
Dept: PULMONOLOGY | Facility: CLINIC | Age: 54
End: 2024-05-17
Payer: COMMERCIAL

## 2024-05-17 VITALS — DIASTOLIC BLOOD PRESSURE: 65 MMHG | OXYGEN SATURATION: 98 % | HEART RATE: 85 BPM | SYSTOLIC BLOOD PRESSURE: 94 MMHG

## 2024-05-17 DIAGNOSIS — R09.82 POSTNASAL DRIP: ICD-10-CM

## 2024-05-17 DIAGNOSIS — R05.9 COUGH, UNSPECIFIED: ICD-10-CM

## 2024-05-17 DIAGNOSIS — J98.4 OTHER DISORDERS OF LUNG: ICD-10-CM

## 2024-05-17 DIAGNOSIS — R06.09 OTHER FORMS OF DYSPNEA: ICD-10-CM

## 2024-05-17 DIAGNOSIS — R93.89 ABNORMAL FINDINGS ON DIAGNOSTIC IMAGING OF OTHER SPECIFIED BODY STRUCTURES: ICD-10-CM

## 2024-05-17 DIAGNOSIS — J47.9 BRONCHIECTASIS, UNCOMPLICATED: ICD-10-CM

## 2024-05-17 PROCEDURE — 99214 OFFICE O/P EST MOD 30 MIN: CPT | Mod: 25

## 2024-05-17 PROCEDURE — 94060 EVALUATION OF WHEEZING: CPT

## 2024-05-17 RX ORDER — BUDESONIDE AND FORMOTEROL FUMARATE DIHYDRATE 80; 4.5 UG/1; UG/1
80-4.5 AEROSOL RESPIRATORY (INHALATION)
Qty: 1 | Refills: 3 | Status: ACTIVE | COMMUNITY
Start: 2024-05-17 | End: 1900-01-01

## 2024-05-17 NOTE — REASON FOR VISIT
[Follow-Up] : a follow-up visit [Abnormal CXR/ Chest CT] : an abnormal CXR/ chest CT [Cough] : cough [Bronchiectasis] : bronchiectasis [Pulmonary Nodules] : pulmonary nodules

## 2024-05-18 PROBLEM — R06.09 EXERTIONAL DYSPNEA: Status: ACTIVE | Noted: 2024-04-05

## 2024-05-18 PROBLEM — J98.4 APICAL LUNG SCARRING: Status: ACTIVE | Noted: 2023-01-13

## 2024-05-18 PROBLEM — R93.89 ABNORMAL CHEST X-RAY: Status: ACTIVE | Noted: 2022-08-12

## 2024-05-18 PROBLEM — R09.82 POSTNASAL DRIP: Status: ACTIVE | Noted: 2022-03-10

## 2024-05-18 NOTE — ASSESSMENT
[FreeTextEntry1] : Obtained and reviewed pulmonary function test and chest CT scan results with patient today. Prescribed Z-Charlie and started patient on a Medrol Dosepak taper for bronchiectasis exacerbation. Start Symbicort HFA for bronchiectasis.   Prescribed Flonase nasal spray 2 sprays each nostril once a day for postnasal drip. Repeat chest CT scan for follow up in 1 year.. Return for pulmonary follow up in 1 month after receiving chest CT scan.

## 2024-05-18 NOTE — REVIEW OF SYSTEMS
[Cough] : cough [SOB on Exertion] : sob on exertion [Negative] : Endocrine [TextBox_14] : Throat clearing

## 2024-05-18 NOTE — PROCEDURE
[FreeTextEntry1] : Spirometry is within normal limits with some improvement postbronchodilator. ________ Chest CT scan performed April 18, 2024 showed stable biapical pleural parenchymal scarring.  Stable bilateral pulmonary calcified granulomas.  Stable right middle lobe bronchiectasis and mucous plugging.

## 2024-05-18 NOTE — HISTORY OF PRESENT ILLNESS
[TextBox_4] : PAULETTE OATES is a 52 year old female, with history of Bronchiectasis, GERD, lung scarring, who presents to the office for follow up evaluation. Patient reports of having symptoms of a cough that started 2 months ago. She also states of having symptoms of throat clearing and dyspnea on exertion when going up an incline. Patient reports that she received a course of Z-Charlie and Benzonatate for her symptoms. She states that she is currently not using a nasal spray.    Paulette is here for chest CT scan follow-up.  Her cough is much better.

## 2024-07-25 ENCOUNTER — APPOINTMENT (OUTPATIENT)
Dept: PULMONOLOGY | Facility: CLINIC | Age: 54
End: 2024-07-25
Payer: COMMERCIAL

## 2024-07-25 VITALS — HEART RATE: 90 BPM | SYSTOLIC BLOOD PRESSURE: 89 MMHG | OXYGEN SATURATION: 98 % | DIASTOLIC BLOOD PRESSURE: 57 MMHG

## 2024-07-25 DIAGNOSIS — J47.9 BRONCHIECTASIS, UNCOMPLICATED: ICD-10-CM

## 2024-07-25 DIAGNOSIS — R93.89 ABNORMAL FINDINGS ON DIAGNOSTIC IMAGING OF OTHER SPECIFIED BODY STRUCTURES: ICD-10-CM

## 2024-07-25 DIAGNOSIS — R05.9 COUGH, UNSPECIFIED: ICD-10-CM

## 2024-07-25 DIAGNOSIS — J98.4 OTHER DISORDERS OF LUNG: ICD-10-CM

## 2024-07-25 PROCEDURE — 94729 DIFFUSING CAPACITY: CPT

## 2024-07-25 PROCEDURE — 94727 GAS DIL/WSHOT DETER LNG VOL: CPT

## 2024-07-25 PROCEDURE — 99214 OFFICE O/P EST MOD 30 MIN: CPT | Mod: 25

## 2024-07-25 PROCEDURE — 94010 BREATHING CAPACITY TEST: CPT

## 2024-07-25 PROCEDURE — ZZZZZ: CPT

## 2024-07-25 NOTE — ASSESSMENT
[FreeTextEntry1] : Obtained and reviewed pulmonary function test and chest CT scan results with patient today. Continue Symbicort HFA for bronchiectasis.   Prescribed Flonase nasal spray 2 sprays each nostril once a day for postnasal drip. Repeat chest CT scan for follow up in 9 months. Return for pulmonary follow up in 3 months

## 2024-07-25 NOTE — PROCEDURE
[FreeTextEntry1] : Pulmonary Function Test performed in my office today: Spirometry: Within normal limits; Lung Volume: Within normal limits; Diffusion: Within normal limits. ________ Chest CT scan performed April 18, 2024 showed stable biapical pleural parenchymal scarring.  Stable bilateral pulmonary calcified granulomas.  Stable right middle lobe bronchiectasis and mucous plugging.

## 2024-08-26 ENCOUNTER — NON-APPOINTMENT (OUTPATIENT)
Age: 54
End: 2024-08-26

## 2024-10-24 ENCOUNTER — APPOINTMENT (OUTPATIENT)
Dept: PULMONOLOGY | Facility: CLINIC | Age: 54
End: 2024-10-24
Payer: COMMERCIAL

## 2024-10-24 VITALS — SYSTOLIC BLOOD PRESSURE: 89 MMHG | DIASTOLIC BLOOD PRESSURE: 51 MMHG | HEART RATE: 92 BPM | OXYGEN SATURATION: 100 %

## 2024-10-24 DIAGNOSIS — R09.82 POSTNASAL DRIP: ICD-10-CM

## 2024-10-24 DIAGNOSIS — R05.9 COUGH, UNSPECIFIED: ICD-10-CM

## 2024-10-24 DIAGNOSIS — J47.9 BRONCHIECTASIS, UNCOMPLICATED: ICD-10-CM

## 2024-10-24 PROCEDURE — 99214 OFFICE O/P EST MOD 30 MIN: CPT | Mod: 25

## 2024-10-24 PROCEDURE — 94010 BREATHING CAPACITY TEST: CPT

## 2024-10-24 PROCEDURE — ZZZZZ: CPT

## 2024-10-24 PROCEDURE — 88738 HGB QUANT TRANSCUTANEOUS: CPT

## 2024-10-24 PROCEDURE — 94727 GAS DIL/WSHOT DETER LNG VOL: CPT

## 2024-10-24 PROCEDURE — 94729 DIFFUSING CAPACITY: CPT

## 2024-10-27 LAB — POCT - HEMOGLOBIN (HGB), QUANTITATIVE, TRANSCUTANEOUS: 11.8

## 2024-12-06 ENCOUNTER — RX RENEWAL (OUTPATIENT)
Age: 54
End: 2024-12-06

## 2024-12-24 ENCOUNTER — APPOINTMENT (OUTPATIENT)
Dept: PULMONOLOGY | Facility: CLINIC | Age: 54
End: 2024-12-24
Payer: COMMERCIAL

## 2024-12-24 VITALS
OXYGEN SATURATION: 99 % | HEART RATE: 102 BPM | DIASTOLIC BLOOD PRESSURE: 73 MMHG | RESPIRATION RATE: 16 BRPM | BODY MASS INDEX: 21.17 KG/M2 | HEIGHT: 64 IN | WEIGHT: 124 LBS | SYSTOLIC BLOOD PRESSURE: 121 MMHG

## 2024-12-24 DIAGNOSIS — J47.9 BRONCHIECTASIS, UNCOMPLICATED: ICD-10-CM

## 2024-12-24 DIAGNOSIS — R05.9 COUGH, UNSPECIFIED: ICD-10-CM

## 2024-12-24 DIAGNOSIS — J47.1 BRONCHIECTASIS WITH (ACUTE) EXACERBATION: ICD-10-CM

## 2024-12-24 DIAGNOSIS — B99.9 BRONCHIECTASIS WITH (ACUTE) EXACERBATION: ICD-10-CM

## 2024-12-24 PROCEDURE — 71046 X-RAY EXAM CHEST 2 VIEWS: CPT

## 2024-12-24 PROCEDURE — 99214 OFFICE O/P EST MOD 30 MIN: CPT | Mod: 25

## 2024-12-24 RX ORDER — AZITHROMYCIN 250 MG/1
250 TABLET, FILM COATED ORAL
Qty: 1 | Refills: 1 | Status: ACTIVE | COMMUNITY
Start: 2024-12-24 | End: 1900-01-01

## 2024-12-24 RX ORDER — PREDNISONE 10 MG/1
10 TABLET ORAL
Qty: 18 | Refills: 0 | Status: ACTIVE | COMMUNITY
Start: 2024-12-24 | End: 1900-01-01

## 2025-01-23 ENCOUNTER — APPOINTMENT (OUTPATIENT)
Dept: PULMONOLOGY | Facility: CLINIC | Age: 55
End: 2025-01-23
Payer: COMMERCIAL

## 2025-01-23 VITALS
HEART RATE: 88 BPM | OXYGEN SATURATION: 99 % | BODY MASS INDEX: 21.39 KG/M2 | HEIGHT: 64 IN | WEIGHT: 125.31 LBS | SYSTOLIC BLOOD PRESSURE: 95 MMHG | DIASTOLIC BLOOD PRESSURE: 61 MMHG

## 2025-01-23 DIAGNOSIS — R05.9 COUGH, UNSPECIFIED: ICD-10-CM

## 2025-01-23 DIAGNOSIS — J47.9 BRONCHIECTASIS, UNCOMPLICATED: ICD-10-CM

## 2025-01-23 DIAGNOSIS — K21.9 GASTRO-ESOPHAGEAL REFLUX DISEASE W/OUT ESOPHAGITIS: ICD-10-CM

## 2025-01-23 DIAGNOSIS — R09.82 POSTNASAL DRIP: ICD-10-CM

## 2025-01-23 PROCEDURE — 94727 GAS DIL/WSHOT DETER LNG VOL: CPT

## 2025-01-23 PROCEDURE — ZZZZZ: CPT

## 2025-01-23 PROCEDURE — 94010 BREATHING CAPACITY TEST: CPT

## 2025-01-23 PROCEDURE — 99214 OFFICE O/P EST MOD 30 MIN: CPT | Mod: 25

## 2025-01-23 PROCEDURE — 94729 DIFFUSING CAPACITY: CPT

## 2025-01-23 RX ORDER — FLUTICASONE PROPIONATE 50 UG/1
50 SPRAY, METERED NASAL
Qty: 3 | Refills: 3 | Status: ACTIVE | COMMUNITY
Start: 2025-01-23 | End: 1900-01-01

## 2025-05-01 ENCOUNTER — NON-APPOINTMENT (OUTPATIENT)
Age: 55
End: 2025-05-01

## 2025-05-01 ENCOUNTER — APPOINTMENT (OUTPATIENT)
Dept: PULMONOLOGY | Facility: CLINIC | Age: 55
End: 2025-05-01
Payer: COMMERCIAL

## 2025-05-01 VITALS — SYSTOLIC BLOOD PRESSURE: 93 MMHG | HEART RATE: 76 BPM | DIASTOLIC BLOOD PRESSURE: 59 MMHG | OXYGEN SATURATION: 99 %

## 2025-05-01 DIAGNOSIS — R05.9 COUGH, UNSPECIFIED: ICD-10-CM

## 2025-05-01 DIAGNOSIS — J47.9 BRONCHIECTASIS, UNCOMPLICATED: ICD-10-CM

## 2025-05-01 DIAGNOSIS — R09.82 POSTNASAL DRIP: ICD-10-CM

## 2025-05-01 PROCEDURE — 88738 HGB QUANT TRANSCUTANEOUS: CPT

## 2025-05-01 PROCEDURE — 94010 BREATHING CAPACITY TEST: CPT

## 2025-05-01 PROCEDURE — 94729 DIFFUSING CAPACITY: CPT

## 2025-05-01 PROCEDURE — ZZZZZ: CPT

## 2025-05-01 PROCEDURE — 99214 OFFICE O/P EST MOD 30 MIN: CPT | Mod: 25

## 2025-05-01 PROCEDURE — 94727 GAS DIL/WSHOT DETER LNG VOL: CPT

## 2025-05-04 LAB — POCT - HEMOGLOBIN (HGB), QUANTITATIVE, TRANSCUTANEOUS: 12.1

## 2025-08-08 ENCOUNTER — APPOINTMENT (OUTPATIENT)
Dept: PULMONOLOGY | Facility: CLINIC | Age: 55
End: 2025-08-08
Payer: COMMERCIAL

## 2025-08-08 VITALS — SYSTOLIC BLOOD PRESSURE: 100 MMHG | DIASTOLIC BLOOD PRESSURE: 63 MMHG | HEART RATE: 99 BPM | OXYGEN SATURATION: 97 %

## 2025-08-08 DIAGNOSIS — R09.82 POSTNASAL DRIP: ICD-10-CM

## 2025-08-08 DIAGNOSIS — J47.9 BRONCHIECTASIS, UNCOMPLICATED: ICD-10-CM

## 2025-08-08 DIAGNOSIS — R05.9 COUGH, UNSPECIFIED: ICD-10-CM

## 2025-08-08 PROCEDURE — 99214 OFFICE O/P EST MOD 30 MIN: CPT | Mod: 25

## 2025-08-08 PROCEDURE — 94727 GAS DIL/WSHOT DETER LNG VOL: CPT

## 2025-08-08 PROCEDURE — 94729 DIFFUSING CAPACITY: CPT

## 2025-08-08 PROCEDURE — ZZZZZ: CPT

## 2025-08-08 PROCEDURE — 94010 BREATHING CAPACITY TEST: CPT
